# Patient Record
Sex: MALE
[De-identification: names, ages, dates, MRNs, and addresses within clinical notes are randomized per-mention and may not be internally consistent; named-entity substitution may affect disease eponyms.]

---

## 2024-09-26 ENCOUNTER — APPOINTMENT (OUTPATIENT)
Dept: ORTHOPEDIC SURGERY | Facility: CLINIC | Age: 19
End: 2024-09-26
Payer: MEDICAID

## 2024-09-26 VITALS — HEART RATE: 71 BPM | DIASTOLIC BLOOD PRESSURE: 60 MMHG | SYSTOLIC BLOOD PRESSURE: 108 MMHG

## 2024-09-26 VITALS — WEIGHT: 128 LBS | HEIGHT: 66 IN | BODY MASS INDEX: 20.57 KG/M2

## 2024-09-26 DIAGNOSIS — S63.501A UNSPECIFIED SPRAIN OF RIGHT WRIST, INITIAL ENCOUNTER: ICD-10-CM

## 2024-09-26 DIAGNOSIS — M25.531 PAIN IN RIGHT WRIST: ICD-10-CM

## 2024-09-26 PROBLEM — Z00.00 ENCOUNTER FOR PREVENTIVE HEALTH EXAMINATION: Status: ACTIVE | Noted: 2024-09-26

## 2024-09-26 PROCEDURE — 99203 OFFICE O/P NEW LOW 30 MIN: CPT | Mod: 25

## 2024-09-26 PROCEDURE — 73110 X-RAY EXAM OF WRIST: CPT | Mod: RT

## 2024-09-26 RX ORDER — MELOXICAM 15 MG/1
15 TABLET ORAL DAILY
Qty: 30 | Refills: 0 | Status: ACTIVE | COMMUNITY
Start: 2024-09-26 | End: 1900-01-01

## 2024-09-26 NOTE — HISTORY OF PRESENT ILLNESS
[de-identified] : This patient presents for initial consultation regarding complaints of right wrist pain going on for about 6 weeks now.  He does do a lot of kickboxing the gym and working out he feels he may have injured during 1 of those episodes.  He denies any specific injury though.  Denies any swelling or ecchymosis.  Pain level 3 out of 10.  Pain worse with extension of the wrist joint and weightbearing.  Pain is improved with rest.  Denies numbness and tingling.  Denies radicular symptoms.

## 2024-09-26 NOTE — PHYSICAL EXAM
[de-identified] : The patient appears well nourished  and in no apparent distress.  The patient is alert and oriented to person, place, and time.   Affect and mood appear normal.    The head is normocephalic and atraumatic.  The eyes reveal normal sclera and extra ocular muscles are intact.   The neck appears normal with no jugular venous distention or masses noted.   Skin shows normal turgor with no evidence of eczema or psoriasis.  No respiratory distress noted.  The patient ambulates with a normal gait.  The right wrist has full range of motion in flexion, extension, ulnar/radial deviation, pronation/supination.  Mild discomfort noted with dorsi flexion of the wrist.  .  There is no tenderness to palpation. There is no soft tissue swelling.  No instability is noted.  There is no warmth or erythema.   strength is normal.  Pulses and capillary refill are normal.   No clubbing, cyanosis, or edema noted.  No lymphadenopathy noted.   [de-identified] : AP, lateral, and oblique views of the right wrist were obtained.  The joint spaces are well maintained without arthritic changes.  There is no evidence of fracture, dislocation, or carpal instability.

## 2024-09-26 NOTE — DISCUSSION/SUMMARY
[de-identified] : This patient presents for initial consultation regarding complaints of right wrist pain ongoing for about 6 weeks now.  His face exam reveals evidence of a mild wrist sprain.  No evidence of any ligamentous injury or carpal tunnel syndrome is noted.  I do think symptoms will continue to improve.  Will place him on a course of anti-inflammatories for 14 days with reduced activities.  If he continues have symptoms would recommend an MRI on  for further evaluation.  At least 30 minutes was spent performing the evaluation and management on today's office visit.  This includes but is not limited to preparing to see patient including review of any test results or outside medical records, obtaining and/or reviewing separately obtained history, performing examination and evaluation, counseling and educating the patient on their diagnosis and treatment recommendations, ordering medications, tests, or procedures, documenting clinical information in the electronic health record, independently interpreting results (not separately reported) and communicating results to the patient, and coordination of care.

## 2024-10-14 ENCOUNTER — APPOINTMENT (OUTPATIENT)
Dept: ORTHOPEDIC SURGERY | Facility: CLINIC | Age: 19
End: 2024-10-14

## 2025-01-13 ENCOUNTER — NON-APPOINTMENT (OUTPATIENT)
Age: 20
End: 2025-01-13

## 2025-06-09 ENCOUNTER — NON-APPOINTMENT (OUTPATIENT)
Age: 20
End: 2025-06-09

## 2025-06-11 ENCOUNTER — APPOINTMENT (OUTPATIENT)
Dept: OTOLARYNGOLOGY | Facility: CLINIC | Age: 20
End: 2025-06-11
Payer: MEDICAID

## 2025-06-11 VITALS
DIASTOLIC BLOOD PRESSURE: 73 MMHG | HEART RATE: 68 BPM | BODY MASS INDEX: 21.38 KG/M2 | WEIGHT: 133 LBS | HEIGHT: 66 IN | SYSTOLIC BLOOD PRESSURE: 138 MMHG

## 2025-06-11 PROCEDURE — 92567 TYMPANOMETRY: CPT

## 2025-06-11 PROCEDURE — 99203 OFFICE O/P NEW LOW 30 MIN: CPT | Mod: 25
